# Patient Record
Sex: FEMALE | Race: WHITE | NOT HISPANIC OR LATINO | Employment: FULL TIME | URBAN - METROPOLITAN AREA
[De-identification: names, ages, dates, MRNs, and addresses within clinical notes are randomized per-mention and may not be internally consistent; named-entity substitution may affect disease eponyms.]

---

## 2021-08-30 ENCOUNTER — EVALUATION (OUTPATIENT)
Dept: PHYSICAL THERAPY | Facility: CLINIC | Age: 56
End: 2021-08-30
Payer: COMMERCIAL

## 2021-08-30 DIAGNOSIS — Z98.890 S/P MEDIAL MENISCECTOMY OF RIGHT KNEE: Primary | ICD-10-CM

## 2021-08-30 DIAGNOSIS — G89.29 CHRONIC PAIN OF RIGHT KNEE: ICD-10-CM

## 2021-08-30 DIAGNOSIS — Z98.890 S/P LATERAL MENISCECTOMY OF RIGHT KNEE: ICD-10-CM

## 2021-08-30 DIAGNOSIS — M25.561 CHRONIC PAIN OF RIGHT KNEE: ICD-10-CM

## 2021-08-30 PROCEDURE — 97161 PT EVAL LOW COMPLEX 20 MIN: CPT

## 2021-08-30 PROCEDURE — 97140 MANUAL THERAPY 1/> REGIONS: CPT

## 2021-08-30 PROCEDURE — 97110 THERAPEUTIC EXERCISES: CPT

## 2021-08-30 RX ORDER — MELOXICAM 15 MG/1
15 TABLET ORAL DAILY
COMMUNITY

## 2021-08-30 NOTE — PROGRESS NOTES
PT Evaluation     Today's date: 2021  Patient name: Tan Patiño  : 1965  MRN: [id number]  Referring provider: Tova Cardozo DO  Dx:   Encounter Diagnosis     ICD-10-CM    1  S/P medial meniscectomy of right knee  Z98 890    2  S/P lateral meniscectomy of right knee  Z98 890    3  Chronic pain of right knee  M25 561     G89 29        Start Time: 844  Stop Time: 945  Total time in clinic (min): 61 minutes    Assessment  Assessment details: 47 yo female ~ 2 mos  s/p R knee arthroscopic med/lat partial menisectomy with cont pain and edema  Pt with dec R knee flex AROM and 4/5 RLE gross strength with fair quad contraction on the R  Tender to palp of R med knee joint line with edema noted in this region as well  Limited walking and standing jacques with shorter LLE step length to dec RLE WB during gait  Pt with difficulty performing all daily activities at home and work as well as unable to perform regular fitness routine due to above deficits  She would benefit from PT to address those deficits  Impairments: abnormal gait, abnormal or restricted ROM, activity intolerance, impaired physical strength, lacks appropriate home exercise program and pain with function  Functional limitations: unable to perform fitness regime; pain with standing/walking-limits job jacques;  Understanding of Dx/Px/POC: excellent  Goals  Pt will achieve the following goals in the next 2 weeks  STG  Indep with current HEP  Dec pain by 1-3 levels  R knee AROM flex 110 deg    Pt will achieve the following goals by d/c (8-12 weeks as stated in plan)  LTG  indep and compliant with HEP in order to maximize gains achieved in therapy  0-2/10 pain scale in order to feel better and decrease/eliminate use of  pain &/or anti-inflammatory  meds  R knee/LE ROM WFL and  strength 4-5/5 in order to improve functional mobility   Amb safely level/unlevel x community distances without gait deviation   Improve  FOTO score to 74 or greater and/or  LEFS score to 80% or more  to indicate inc functional ability of patient  Resume activities with less pain and limitation at premorbid intensity and duration    Plan  Patient would benefit from: skilled physical therapy  Referral necessary: No  Planned modality interventions: cryotherapy  Planned therapy interventions: manual therapy, joint mobilization, neuromuscular re-education, patient education, gait training, functional ROM exercises, home exercise program, therapeutic exercise, therapeutic activities, stretching and strengthening  Frequency: 2x week  Duration in visits: 10  Duration in weeks: 5  Plan of Care beginning date: 2021  Plan of Care expiration date: 10/4/2021  Treatment plan discussed with: patient        Subjective Evaluation    History of Present Illness  Date of surgery: 2021  Mechanism of injury: 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)  Pt underwent R knee arthroscopic med/lat partial menisectomy 21  Cont with pain and edema (recently aspirated in August)  Now referred for PT intervention  Pt wearing compressive knee sleeve on R knee-with some relief  Sleep better now since aspiration-still awakens b/c of R knee;  indep with ADLs , but awkward and sore  Prolonged standing and walking after prolonged sitting is very painful/stiff  Previously did TM and bike and elliptical-not currenlty  Driving ok  Able to perform household chores with pain and limitaiton-takes longer  Not mowing lawn (push mower)-avoiding uneven ground  Taking meloxicam and tylenol BID with some relief    Feels pops and squishy inside R  Knee with flex/ext          Not a recurrent problem   Quality of life: fair    Pain  Current pain ratin  At best pain ratin  At worst pain ratin  Location: R med>lat knee  Quality: throbbing  Relieving factors: ice, medications and rest  Aggravating factors: standing and walking  Progression: improved    Social Support  Stairs in house: yes (amb steps w  step to step and reciprocally)   12  Lives in: multiple-level home  Lives with: significant other and adult children (sig other and 22 yo daughter)    Employment status: working ([place] [employer] events at [employer] her feet a lot)  Exercise history: regular TM, stationary bike and more recently elliptical    Patient Goals  Patient goals for therapy: decreased edema, decreased pain and return to sport/leisure activities  Patient goal: resume fitness program ; jacques walk/standing without pain or limitation        Objective     Observations     Additional Observation Details  R knee scope sites well healed; Noted edema Med>lat knee;     Palpation     Additional Palpation Details  Tender/painful  to palp of R med knee joint line;  Dec R knee pat mob    Neurological Testing     Sensation     Knee   Left Knee   Intact: light touch    Right Knee   Intact: light touch     Active Range of Motion   Left Knee   Normal active range of motion    Right Knee   Flexion: 104 (sit) degrees   Extension: 0 degrees   Extensor la degrees     Additional Active Range of Motion Details  R knee flex in supine (KTC) 100 deg    Strength/Myotome Testing     Left Hip   Planes of Motion   Flexion: WFL  Extension: WFL  Abduction: 4-    Right Hip   Planes of Motion   Flexion: 4  Extension: 4  Abduction: 4  Adduction: 4    Left Knee   Normal strength  Quadriceps contraction: good    Right Knee   Flexion: 4  Extension: 4  Quadriceps contraction: fair    Additional Strength Details  Deep squat to 100 deg R knee flex supported  H/T raise intact unsupported-slow/awkward    Tests     Additional Tests Details  LEFS outcome  40/80 (50%)    Ambulation     Observational Gait     Additional Observational Gait Details   amb indep without AD x clinic distances and parking lot to clinic   Noted RLE limp -shorter LLE step length      Flowsheet Rows      Most Recent Value   PT/OT G-Codes   Current Score  40   Projected Score  0   Assessment Type Evaluation             Precautions: none       8/30            Manuals #1    FOTO     REASSESS   R knee Pat mobs/PROm AE                                                   Neuro Re-Ed             Foam      -static     -Step f/b, s/s                                                    Ther Ex             Recumbent bike- seat -            Stand H/T, march, butt kicks, squat (shallow), alt hip AROM (progress to vectors as jacques) -            Terminal knee ext w  TB -            LAQ/DF x20                                      R QS, SLR  SAQ  KTC-knee flex       x20  -  x10              S/l hip abd        Hip add x20  -            Prone knee flex, hip ext -                                      Ther Activity                                       Gait Training                                       Modalities             Ice R knee -

## 2021-08-30 NOTE — LETTER
2021    Delaney Paris DO  [address] [facility]    Patient: Nima Marshall   YOB: 1965   Date of Visit: 2021     Encounter Diagnosis     ICD-10-CM    1  S/P medial meniscectomy of right knee  Z98 890    2  S/P lateral meniscectomy of right knee  Z98 890    3  Chronic pain of right knee  M25 561     G89 29        Dear Dr Theresa Espino:    Thank you for your recent referral of Nima Marshall  Please review the attached evaluation summary from Mia's recent visit  Please verify that you agree with the plan of care by signing the attached order  If you have any questions or concerns, please do not hesitate to call  I sincerely appreciate the opportunity to share in the care of one of your patients and hope to have another opportunity to work with you in the near future  Sincerely,    Charo Carrillo, PT      Referring Provider:      I certify that I have read the below Plan of Care and certify the need for these services furnished under this plan of treatment while under my care  Delaney Paris DO  [address]  [address]          PT Evaluation     Today's date: 2021  Patient name: Nima Marshall  : 1965  MRN: [id number]  Referring provider: Damian Barrera DO  Dx:   Encounter Diagnosis     ICD-10-CM    1  S/P medial meniscectomy of right knee  Z98 890    2  S/P lateral meniscectomy of right knee  Z98 890    3  Chronic pain of right knee  M25 561     G89 29        Start Time: 844  Stop Time: 945  Total time in clinic (min): 61 minutes    Assessment  Assessment details: 49 yo female ~ 2 mos  s/p R knee arthroscopic med/lat partial menisectomy with cont pain and edema  Pt with dec R knee flex AROM and 4/5 RLE gross strength with fair quad contraction on the R  Tender to palp of R med knee joint line with edema noted in this region as well    Limited walking and standing jacques with shorter LLE step length to dec RLE WB during gait  Pt with difficulty performing all daily activities at home and work as well as unable to perform regular fitness routine due to above deficits  She would benefit from PT to address those deficits  Impairments: abnormal gait, abnormal or restricted ROM, activity intolerance, impaired physical strength, lacks appropriate home exercise program and pain with function  Functional limitations: unable to perform fitness regime; pain with standing/walking-limits job jacques;  Understanding of Dx/Px/POC: excellent  Goals  Pt will achieve the following goals in the next 2 weeks  STG  Indep with current HEP  Dec pain by 1-3 levels  R knee AROM flex 110 deg    Pt will achieve the following goals by d/c (8-12 weeks as stated in plan)  LTG  indep and compliant with HEP in order to maximize gains achieved in therapy  0-2/10 pain scale in order to feel better and decrease/eliminate use of  pain &/or anti-inflammatory  meds  R knee/LE ROM WFL and  strength 4-5/5 in order to improve functional mobility   Amb safely level/unlevel x community distances without gait deviation   Improve  FOTO score to 74 or greater and/or  LEFS score to 80% or more  to indicate inc functional ability of patient  Resume activities with less pain and limitation at premorbid intensity and duration    Plan  Patient would benefit from: skilled physical therapy  Referral necessary: No  Planned modality interventions: cryotherapy  Planned therapy interventions: manual therapy, joint mobilization, neuromuscular re-education, patient education, gait training, functional ROM exercises, home exercise program, therapeutic exercise, therapeutic activities, stretching and strengthening  Frequency: 2x week  Duration in visits: 10  Duration in weeks: 5  Plan of Care beginning date: 8/30/2021  Plan of Care expiration date: 10/4/2021  Treatment plan discussed with: patient        Subjective Evaluation    History of Present Illness  Date of surgery: 2021  Mechanism of injury: 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)  Pt underwent R knee arthroscopic med/lat partial menisectomy 21  Cont with pain and edema (recently aspirated in August)  Now referred for PT intervention  Pt wearing compressive knee sleeve on R knee-with some relief  Sleep better now since aspiration-still awakens b/c of R knee;  indep with ADLs , but awkward and sore  Prolonged standing and walking after prolonged sitting is very painful/stiff  Previously did TM and bike and elliptical-not currenlty  Driving ok  Able to perform household chores with pain and limitaiton-takes longer  Not mowing lawn (push mower)-avoiding uneven ground  Taking meloxicam and tylenol BID with some relief  Feels pops and squishy inside R  Knee with flex/ext          Not a recurrent problem   Quality of life: fair    Pain  Current pain ratin  At best pain ratin  At worst pain ratin  Location: R med>lat knee  Quality: throbbing  Relieving factors: ice, medications and rest  Aggravating factors: standing and walking  Progression: improved    Social Support  Stairs in house: yes (amb steps w  step to step and reciprocally)   12  Lives in: multiple-level home  Lives with: significant other and adult children (sig other and 20 yo daughter)    Employment status: working ([employer] at [employer] her feet a lot)  Exercise history: regular TM, stationary bike and more recently elliptical    Patient Goals  Patient goals for therapy: decreased edema, decreased pain and return to sport/leisure activities  Patient goal: resume fitness program ; jacques walk/standing without pain or limitation        Objective     Observations     Additional Observation Details  R knee scope sites well healed;   Noted edema Med>lat knee;     Palpation     Additional Palpation Details  Tender/painful  to palp of R med knee joint line;  Dec R knee pat mob    Neurological Testing     Sensation     Knee   Left Knee   Intact: light touch    Right Knee   Intact: light touch     Active Range of Motion   Left Knee   Normal active range of motion    Right Knee   Flexion: 104 (sit) degrees   Extension: 0 degrees   Extensor la degrees     Additional Active Range of Motion Details  R knee flex in supine (KTC) 100 deg    Strength/Myotome Testing     Left Hip   Planes of Motion   Flexion: WFL  Extension: WFL  Abduction: 4-    Right Hip   Planes of Motion   Flexion: 4  Extension: 4  Abduction: 4  Adduction: 4    Left Knee   Normal strength  Quadriceps contraction: good    Right Knee   Flexion: 4  Extension: 4  Quadriceps contraction: fair    Additional Strength Details  Deep squat to 100 deg R knee flex supported  H/T raise intact unsupported-slow/awkward    Tests     Additional Tests Details  LEFS outcome  40/80 (50%)    Ambulation     Observational Gait     Additional Observational Gait Details   amb indep without AD x clinic distances and parking lot to clinic   Noted RLE limp -shorter LLE step length      Flowsheet Rows      Most Recent Value   PT/OT G-Codes   Current Score  40   Projected Score  0   Assessment Type  Evaluation             Precautions: none                   Manuals #1    FOTO     REASSESS   R knee Pat mobs/PROm AE                                                   Neuro Re-Ed             Foam      -static     -Step f/b, s/s                                                    Ther Ex             Recumbent bike- seat -            Stand H/T, march, butt kicks, squat (shallow), alt hip AROM (progress to vectors as jacques) -            Terminal knee ext w  TB -            LAQ/DF x20                                      R QS, SLR  SAQ  KTC-knee flex       x20  -  x10              S/l hip abd        Hip add x20  -            Prone knee flex, hip ext -                                      Ther Activity                                       Gait Training Modalities             Ice R knee -

## 2021-09-03 ENCOUNTER — OFFICE VISIT (OUTPATIENT)
Dept: PHYSICAL THERAPY | Facility: CLINIC | Age: 56
End: 2021-09-03
Payer: COMMERCIAL

## 2021-09-03 DIAGNOSIS — G89.29 CHRONIC PAIN OF RIGHT KNEE: ICD-10-CM

## 2021-09-03 DIAGNOSIS — Z98.890 S/P LATERAL MENISCECTOMY OF RIGHT KNEE: ICD-10-CM

## 2021-09-03 DIAGNOSIS — Z98.890 S/P MEDIAL MENISCECTOMY OF RIGHT KNEE: Primary | ICD-10-CM

## 2021-09-03 DIAGNOSIS — M25.561 CHRONIC PAIN OF RIGHT KNEE: ICD-10-CM

## 2021-09-03 PROCEDURE — 97140 MANUAL THERAPY 1/> REGIONS: CPT

## 2021-09-03 PROCEDURE — 97110 THERAPEUTIC EXERCISES: CPT

## 2021-09-03 NOTE — PROGRESS NOTES
Daily Note     Today's date: 9/3/2021  Patient name: Hola Walsh  : 1965  MRN: [id number]  Referring provider: Vaughn Silvestre DO  Dx:   Encounter Diagnosis     ICD-10-CM    1  S/P medial meniscectomy of right knee  Z98 890    2  S/P lateral meniscectomy of right knee  Z98 890    3  Chronic pain of right knee  M25 561     G89 29        Start Time: 95  Stop Time: 915  Total time in clinic (min): 51 minutes    Subjective: pt reports no pain, but feels like something is loose and moving in her knee when she bends/straightens it  Doesn't occur when standing/walking  Objective: See treatment diary below      Assessment: Noted/palpated  clunk/shift along lat joint line of r knee with open chain flex/ext -not painful, but bothers pt  jacques all exer today, though tender with EROm R knee flex during manual therapy  Patient would benefit from continued PT      Plan: Continue per plan of care        Precautions: none       8/30 9/3           Manuals #1 #2   Mery Search     REASSESS   R knee Pat mobs/PROm AE AE                                                  Neuro Re-Ed             Foam      -static     -Step f/b, s/s - -                                                  Ther Ex             Recumbent bike- seat 10 - 10'           Stand H/T, march, butt kicks, squat (shallow), alt hip vectors - x10 all    yTB vectors           Terminal knee ext w  TB - -           LAQ/DF x20 x20                                     R QS, SLR  SAQ  KTC-knee flex self stretch       x20  -  x10   x20 all  X 5           S/l hip abd        Hip add x20  - x20  x20           Prone knee flex, hip ext - x20  x20                                     Ther Activity                                       Gait Training                                       Modalities             Ice R knee -

## 2021-09-08 ENCOUNTER — OFFICE VISIT (OUTPATIENT)
Dept: PHYSICAL THERAPY | Facility: CLINIC | Age: 56
End: 2021-09-08
Payer: COMMERCIAL

## 2021-09-08 DIAGNOSIS — M25.561 CHRONIC PAIN OF RIGHT KNEE: ICD-10-CM

## 2021-09-08 DIAGNOSIS — Z98.890 S/P LATERAL MENISCECTOMY OF RIGHT KNEE: ICD-10-CM

## 2021-09-08 DIAGNOSIS — G89.29 CHRONIC PAIN OF RIGHT KNEE: ICD-10-CM

## 2021-09-08 DIAGNOSIS — Z98.890 S/P MEDIAL MENISCECTOMY OF RIGHT KNEE: Primary | ICD-10-CM

## 2021-09-08 PROCEDURE — 97110 THERAPEUTIC EXERCISES: CPT | Performed by: PHYSICAL THERAPIST

## 2021-09-08 PROCEDURE — 97140 MANUAL THERAPY 1/> REGIONS: CPT | Performed by: PHYSICAL THERAPIST

## 2021-09-08 NOTE — PROGRESS NOTES
Daily Note     Today's date: 2021  Patient name: Roney Strauss  : 1965  MRN: [id number]  Referring provider: Susan Ponce DO  Dx:   Encounter Diagnosis     ICD-10-CM    1  S/P medial meniscectomy of right knee  Z98 890    2  S/P lateral meniscectomy of right knee  Z98 890    3  Chronic pain of right knee  M25 561     G89 29        Start Time: 8359  Stop Time: 0840  Total time in clinic (min): 50 minutes    Subjective: Pain range 0-6/10  Discontinued brace, felt more unstable when wearing brace  Has increased walking to 6,000 steps per day without brace  Discontinued meloxicam due to stomach pain  Objective: See treatment diary below      Assessment: Pt discontinued meloxicam and brace  Significant crepitus R knee with activity in WB and NWB activity  Improved WB over past few weeks  Continue with open and close chain strengthening with goal of increasing activity level with minimal pain  Plan: Progress treatment as tolerated         Precautions: none       8/30 9/3 9/8          Manuals #1 #2 #3  FOTO     REASSESS   R knee Pat mobs/PROm AE AE CRR                                                 Neuro Re-Ed             Foam      -static     -Step f/b, s/s - - -                                                 Ther Ex             Recumbent bike- seat 10 - 10' 10'          Stand H/T, march, butt kicks, squat (shallow), alt hip vectors - x10 all    yTB vectors 20x HT  10x all  vectors          Terminal knee ext w  TB - - 10x YTB 5" hold          LAQ/DF x20 x20 20x                                    R QS, SLR  SAQ  KTC-knee flex self stretch       x20  -  x10   x20 all  X 5 20x all    10x          S/l hip abd        Hip add x20  - x20  x20 20x  20x          Prone knee flex, hip ext - x20  x20 20x  20x                                    Ther Activity                                       Gait Training                                       Modalities             Ice R knee -

## 2021-09-10 ENCOUNTER — OFFICE VISIT (OUTPATIENT)
Dept: PHYSICAL THERAPY | Facility: CLINIC | Age: 56
End: 2021-09-10
Payer: COMMERCIAL

## 2021-09-10 DIAGNOSIS — M25.561 CHRONIC PAIN OF RIGHT KNEE: ICD-10-CM

## 2021-09-10 DIAGNOSIS — G89.29 CHRONIC PAIN OF RIGHT KNEE: ICD-10-CM

## 2021-09-10 DIAGNOSIS — Z98.890 S/P MEDIAL MENISCECTOMY OF RIGHT KNEE: Primary | ICD-10-CM

## 2021-09-10 DIAGNOSIS — Z98.890 S/P LATERAL MENISCECTOMY OF RIGHT KNEE: ICD-10-CM

## 2021-09-10 PROCEDURE — 97140 MANUAL THERAPY 1/> REGIONS: CPT

## 2021-09-10 PROCEDURE — 97110 THERAPEUTIC EXERCISES: CPT

## 2021-09-13 ENCOUNTER — APPOINTMENT (OUTPATIENT)
Dept: PHYSICAL THERAPY | Facility: CLINIC | Age: 56
End: 2021-09-13
Payer: COMMERCIAL

## 2021-09-16 ENCOUNTER — APPOINTMENT (OUTPATIENT)
Dept: PHYSICAL THERAPY | Facility: CLINIC | Age: 56
End: 2021-09-16
Payer: COMMERCIAL

## 2021-09-20 ENCOUNTER — APPOINTMENT (OUTPATIENT)
Dept: PHYSICAL THERAPY | Facility: CLINIC | Age: 56
End: 2021-09-20
Payer: COMMERCIAL

## 2021-10-11 NOTE — PROGRESS NOTES
Pt not seen since 9/10/21 appt  Had phoned stating she would cont with HEP until dr cuevas and return if needed  At this time (>1 month since last session), pt hasn't contacted this clinic about cont PT  Pt is now d/c'd 
Modalities             Ice R knee -

## 2023-03-23 ENCOUNTER — OFFICE VISIT (OUTPATIENT)
Age: 58
End: 2023-03-23

## 2023-03-23 ENCOUNTER — APPOINTMENT (OUTPATIENT)
Age: 58
End: 2023-03-23

## 2023-03-23 VITALS
RESPIRATION RATE: 18 BRPM | SYSTOLIC BLOOD PRESSURE: 122 MMHG | HEIGHT: 64 IN | TEMPERATURE: 97.8 F | OXYGEN SATURATION: 96 % | DIASTOLIC BLOOD PRESSURE: 72 MMHG | HEART RATE: 88 BPM | BODY MASS INDEX: 33.29 KG/M2 | WEIGHT: 195 LBS

## 2023-03-23 DIAGNOSIS — M79.644 THUMB PAIN, RIGHT: ICD-10-CM

## 2023-03-23 DIAGNOSIS — S63.591A SPRAIN OF ULNAR COLLATERAL LIGAMENT OF RIGHT WRIST, INITIAL ENCOUNTER: Primary | ICD-10-CM

## 2023-03-23 RX ORDER — PANTOPRAZOLE SODIUM 20 MG/1
20 TABLET, DELAYED RELEASE ORAL DAILY
COMMUNITY
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
## 2023-03-27 ENCOUNTER — TELEPHONE (OUTPATIENT)
Dept: OBGYN CLINIC | Facility: OTHER | Age: 58
End: 2023-03-27

## 2023-03-27 NOTE — TELEPHONE ENCOUNTER
Patient is being referred to a orthopedics  Please schedule accordingly      [address]   [phone]